# Patient Record
Sex: MALE | Race: WHITE | NOT HISPANIC OR LATINO | Employment: FULL TIME | URBAN - NONMETROPOLITAN AREA
[De-identification: names, ages, dates, MRNs, and addresses within clinical notes are randomized per-mention and may not be internally consistent; named-entity substitution may affect disease eponyms.]

---

## 2018-04-06 ENCOUNTER — APPOINTMENT (OUTPATIENT)
Dept: GENERAL RADIOLOGY | Facility: HOSPITAL | Age: 23
End: 2018-04-06

## 2018-04-06 ENCOUNTER — HOSPITAL ENCOUNTER (EMERGENCY)
Facility: HOSPITAL | Age: 23
Discharge: HOME | End: 2018-04-06
Attending: STUDENT IN AN ORGANIZED HEALTH CARE EDUCATION/TRAINING PROGRAM | Admitting: STUDENT IN AN ORGANIZED HEALTH CARE EDUCATION/TRAINING PROGRAM

## 2018-04-06 VITALS
SYSTOLIC BLOOD PRESSURE: 149 MMHG | HEART RATE: 79 BPM | DIASTOLIC BLOOD PRESSURE: 88 MMHG | OXYGEN SATURATION: 99 % | WEIGHT: 220 LBS | BODY MASS INDEX: 29.8 KG/M2 | RESPIRATION RATE: 16 BRPM | HEIGHT: 72 IN | TEMPERATURE: 98.5 F

## 2018-04-06 DIAGNOSIS — S90.32XA CONTUSION OF LEFT FOOT, INITIAL ENCOUNTER: Primary | ICD-10-CM

## 2018-04-06 DIAGNOSIS — S90.812A ABRASION, LEFT FOOT, INITIAL ENCOUNTER: ICD-10-CM

## 2018-04-06 PROCEDURE — 73630 X-RAY EXAM OF FOOT: CPT

## 2018-04-06 PROCEDURE — 99283 EMERGENCY DEPT VISIT LOW MDM: CPT

## 2018-04-06 RX ORDER — NAPROXEN 500 MG/1
500 TABLET ORAL 2 TIMES DAILY PRN
Qty: 14 TABLET | Refills: 0 | Status: SHIPPED | OUTPATIENT
Start: 2018-04-06

## 2018-04-06 NOTE — DISCHARGE INSTRUCTIONS
Keep foot elevated as much as possible for the next 24 hours.  Apply cold compresses every 3-4 hours for 20 minutes.  Wear your fracture shoe for the next 7 days.  Recheck in 3 days if no improvement.  Follow-up with Dr. Vieira for orthopedic evaluation if symptoms persist.  Return to the emergency department immediately if any change or worsening of symptoms.

## 2018-04-06 NOTE — ED PROVIDER NOTES
"Subjective   22-year-old male presents to emergency department with contusion to the dorsum of his left foot.  Was compacting soil with a \"jumping jaqueline\" soil compactor when it jumped across the dorsum of his foot while he was wearing steel toed boots.  He has an abrasion and bruising to the dorsum of the foot with some pain upon weightbearing and active dorsiflexion of the toes.  No paresis paresthesias no ankle pain lower leg pain, denies other symptoms or injuries.  Tetanus immunization is up-to-date.        History provided by:  Patient  Lower Extremity Issue   Location:  Foot  Time since incident:  2 hours  Injury: yes    Mechanism of injury comment:  Struck with soil compactor  Foot location:  Dorsum of L foot  Pain details:     Quality:  Aching and throbbing    Severity:  Moderate    Onset quality:  Sudden    Duration:  2 hours    Timing:  Constant  Chronicity:  New  Dislocation: no    Foreign body present:  No foreign bodies  Tetanus status:  Up to date  Prior injury to area:  No  Relieved by:  Nothing  Worsened by:  Nothing  Ineffective treatments:  None tried  Associated symptoms: decreased ROM    Associated symptoms: no back pain        Review of Systems   Musculoskeletal: Negative for back pain.   All other systems reviewed and are negative.      History reviewed. No pertinent past medical history.    No Known Allergies    Past Surgical History:   Procedure Laterality Date   • SHOULDER SURGERY Left     X2       History reviewed. No pertinent family history.    Social History     Social History   • Marital status: Single     Social History Main Topics   • Smoking status: Never Smoker   • Alcohol use No   • Drug use: No   • Sexual activity: Defer     Other Topics Concern   • Not on file           Objective   Physical Exam   Constitutional: He is oriented to person, place, and time. He appears well-developed and well-nourished. No distress.   HENT:   Head: Normocephalic and atraumatic.   Right Ear: External " ear normal.   Left Ear: External ear normal.   Nose: Nose normal.   Mouth/Throat: Oropharynx is clear and moist. No oropharyngeal exudate.   Eyes: Conjunctivae and EOM are normal. Pupils are equal, round, and reactive to light. Right eye exhibits no discharge. Left eye exhibits no discharge. No scleral icterus.   Neck: Normal range of motion. Neck supple. No JVD present. No tracheal deviation present. No thyromegaly present.   Cardiovascular: Normal rate.    Pulmonary/Chest: Effort normal. No stridor. No respiratory distress.   Abdominal: Soft. He exhibits no distension.   Musculoskeletal: Normal range of motion. He exhibits edema and tenderness. He exhibits no deformity.   Soft tissue swelling and bruising noted to dorsum of the left foot with abrasion over the central dorsum.  He is able to actively flex and extend all digits, with some restriction of flexion of the fourth and fifth toes that he states is new.  Capillary refill is brisk sensation is intact distally.  Ankle lower leg and knee nontender to palpation.   Neurological: He is alert and oriented to person, place, and time. No cranial nerve deficit. He exhibits normal muscle tone. Coordination normal.   Skin: Skin is warm and dry. No rash noted. He is not diaphoretic. No erythema. No pallor.   Psychiatric: He has a normal mood and affect. His behavior is normal. Judgment and thought content normal.   Nursing note and vitals reviewed.      Procedures         ED Course  ED Course   Comment By Time   No fracture identified on x-ray.  Will place patient in fracture shoe for comfort, naproxen for pain and swelling.  Ice every 2-3 hours for 20 minutes.  Follow-up with or so if symptoms persist.  Return if any change or worsening.  Verbalizes understanding and is agreeable with plan. Master Cabrera PA-C 04/06 1514                  [facility]    Final diagnoses:   Contusion of left foot, initial encounter   Abrasion, left foot, initial encounter            Master  Ihsan Cabrera PA-C  04/06/18 [id number]